# Patient Record
Sex: FEMALE | Race: WHITE | NOT HISPANIC OR LATINO | URBAN - METROPOLITAN AREA
[De-identification: names, ages, dates, MRNs, and addresses within clinical notes are randomized per-mention and may not be internally consistent; named-entity substitution may affect disease eponyms.]

---

## 2020-09-16 ENCOUNTER — OUTPATIENT (OUTPATIENT)
Dept: OUTPATIENT SERVICES | Facility: HOSPITAL | Age: 75
LOS: 1 days | End: 2020-09-16

## 2020-10-31 ENCOUNTER — OUTPATIENT (OUTPATIENT)
Dept: OUTPATIENT SERVICES | Facility: HOSPITAL | Age: 75
LOS: 1 days | End: 2020-10-31

## 2020-11-10 ENCOUNTER — OUTPATIENT (OUTPATIENT)
Dept: OUTPATIENT SERVICES | Facility: HOSPITAL | Age: 75
LOS: 1 days | End: 2020-11-10

## 2020-12-03 ENCOUNTER — OUTPATIENT (OUTPATIENT)
Dept: OUTPATIENT SERVICES | Facility: HOSPITAL | Age: 75
LOS: 1 days | End: 2020-12-03

## 2021-01-14 ENCOUNTER — OUTPATIENT (OUTPATIENT)
Dept: OUTPATIENT SERVICES | Facility: HOSPITAL | Age: 76
LOS: 1 days | End: 2021-01-14

## 2021-02-24 ENCOUNTER — OUTPATIENT (OUTPATIENT)
Dept: OUTPATIENT SERVICES | Facility: HOSPITAL | Age: 76
LOS: 1 days | End: 2021-02-24

## 2021-09-09 ENCOUNTER — OUTPATIENT (OUTPATIENT)
Dept: OUTPATIENT SERVICES | Facility: HOSPITAL | Age: 76
LOS: 1 days | End: 2021-09-09

## 2021-12-09 ENCOUNTER — OUTPATIENT (OUTPATIENT)
Dept: OUTPATIENT SERVICES | Facility: HOSPITAL | Age: 76
LOS: 1 days | End: 2021-12-09

## 2022-03-17 ENCOUNTER — OUTPATIENT (OUTPATIENT)
Dept: OUTPATIENT SERVICES | Facility: HOSPITAL | Age: 77
LOS: 1 days | End: 2022-03-17

## 2022-03-17 DIAGNOSIS — E78.00 PURE HYPERCHOLESTEROLEMIA, UNSPECIFIED: ICD-10-CM

## 2023-12-19 ENCOUNTER — NON-APPOINTMENT (OUTPATIENT)
Age: 78
End: 2023-12-19

## 2024-01-12 PROBLEM — Z00.00 ENCOUNTER FOR PREVENTIVE HEALTH EXAMINATION: Status: ACTIVE | Noted: 2024-01-12

## 2024-01-24 ENCOUNTER — APPOINTMENT (OUTPATIENT)
Dept: PLASTIC SURGERY | Facility: CLINIC | Age: 79
End: 2024-01-24
Payer: MEDICARE

## 2024-01-24 ENCOUNTER — NON-APPOINTMENT (OUTPATIENT)
Age: 79
End: 2024-01-24

## 2024-01-24 VITALS
OXYGEN SATURATION: 97 % | HEIGHT: 62 IN | BODY MASS INDEX: 29.08 KG/M2 | HEART RATE: 58 BPM | TEMPERATURE: 97 F | WEIGHT: 158 LBS | SYSTOLIC BLOOD PRESSURE: 140 MMHG | DIASTOLIC BLOOD PRESSURE: 60 MMHG

## 2024-01-24 DIAGNOSIS — Z63.4 DISAPPEARANCE AND DEATH OF FAMILY MEMBER: ICD-10-CM

## 2024-01-24 DIAGNOSIS — K85.30 DRUG INDUCED ACUTE PANCREATITIS WITHOUT NECROSIS OR INFECTION: ICD-10-CM

## 2024-01-24 DIAGNOSIS — E11.9 TYPE 2 DIABETES MELLITUS W/OUT COMPLICATIONS: ICD-10-CM

## 2024-01-24 DIAGNOSIS — Z42.8 ENCOUNTER FOR OTHER PLASTIC AND RECONSTRUCTIVE SURGERY FOLLOWING MEDICAL PROCEDURE OR HEALED INJURY: ICD-10-CM

## 2024-01-24 DIAGNOSIS — Z78.9 OTHER SPECIFIED HEALTH STATUS: ICD-10-CM

## 2024-01-24 DIAGNOSIS — I10 ESSENTIAL (PRIMARY) HYPERTENSION: ICD-10-CM

## 2024-01-24 DIAGNOSIS — F17.200 NICOTINE DEPENDENCE, UNSPECIFIED, UNCOMPLICATED: ICD-10-CM

## 2024-01-24 PROCEDURE — 99202 OFFICE O/P NEW SF 15 MIN: CPT

## 2024-01-24 RX ORDER — AMLODIPINE BESYLATE 5 MG/1
TABLET ORAL
Refills: 0 | Status: ACTIVE | COMMUNITY

## 2024-01-24 RX ORDER — LOSARTAN POTASSIUM 100 MG/1
TABLET, FILM COATED ORAL
Refills: 0 | Status: ACTIVE | COMMUNITY

## 2024-01-24 SDOH — SOCIAL STABILITY - SOCIAL INSECURITY: DISSAPEARANCE AND DEATH OF FAMILY MEMBER: Z63.4

## 2024-01-24 NOTE — REASON FOR VISIT
[Consultation] : a consultation visit [FreeTextEntry1] : regarding the biopsy she had done on her nose.

## 2024-03-07 ENCOUNTER — APPOINTMENT (OUTPATIENT)
Dept: PLASTIC SURGERY | Facility: CLINIC | Age: 79
End: 2024-03-07
Payer: MEDICARE

## 2024-03-07 VITALS
DIASTOLIC BLOOD PRESSURE: 66 MMHG | OXYGEN SATURATION: 98 % | HEIGHT: 62 IN | TEMPERATURE: 97.3 F | HEART RATE: 75 BPM | WEIGHT: 157 LBS | SYSTOLIC BLOOD PRESSURE: 134 MMHG | BODY MASS INDEX: 28.89 KG/M2

## 2024-03-07 DIAGNOSIS — C44.311 BASAL CELL CARCINOMA OF SKIN OF NOSE: ICD-10-CM

## 2024-03-07 PROCEDURE — 14060 TIS TRNFR E/N/E/L 10 SQ CM/<: CPT

## 2024-03-07 NOTE — REASON FOR VISIT
[Procedure: _________] : a [unfilled] procedure visit [FreeTextEntry1] : patient states she is here for a Mohs closure on her nose.

## 2024-03-07 NOTE — HISTORY OF PRESENT ILLNESS
[FreeTextEntry1] : "I feel ok" Patient presents for closure of Mohs defect on nose from  earlier today.  She said she was a little bit uncomfortable, but not so bad.  She admits to being a smoker, but states that she will try to keep from smoking during the healing process and is even considering calling the [employer] line for assistance. She has no other complaints.

## 2024-03-07 NOTE — PROCEDURE
[FreeTextEntry1] : Basal cell carcinoma of left nasal tip, Mohs defect of the nasal tip, 1.2 x 0.9 cm [FreeTextEntry3] : Local lidocaine with epinephrine, approximately 4 cc  [FreeTextEntry2] : Bi-lobed flap for closure of left nasal tip Mohs defect, 2 x 4 cm [FreeTextEntry5] : none [FreeTextEntry6] : Following informed consent and marking, the nasal tip and local surrounding tissue was infiltrated with 1% lidocaine with epi. Time was allowed to pass for vasoconstriction. Defect measured 1.2 x 0.9cm. The wound and sebastian wound area were prepped with povidone iodine. The cauterized edges of the Mohs defect were excised to freshen the edges.  The flap incisions were made with a 15 blade scalpel. This was carried through the dermis into the subcutaneous tissue. The wound was undermined under the planned bi-lobed flap in the subcutaneous plane.  The flap was elevated, then rotated into the defect. An extension of the inferior Burow's triangle was made to facilitate the rotation.  The wound was carefully examined for hemostasis. A 5-0 vicryl suture ligature was used to control a bleeding point at the base of the wound.  The closure was then begun. The donor defect was brought together at the critical corner to close the donor defect and advance the flap into the wound. This was found to fit nicely without tension. this was sutured with 5-0 vicryl buried sutures.  The subcutaneous tissue and dermis were brought together at the key point between the lobes of the flap and the wound using interrupted 5-0 Vicryl sutures. The tip of the superior flap was trimmed to match the defect.  The skin was then closed with interrupted simple 6-0 Nylon sutures.  The incision was dressed with vaseline..  Pt was given wound care and follow up instructions. Use Vaseline on the suture line TID. May cover with gauze or a mask if desired.  May shower tomorrow.  Follow up in one week.  Call for any questions or concerns. [FreeTextEntry4] : scant [FreeTextEntry7] : None

## 2024-03-13 ENCOUNTER — APPOINTMENT (OUTPATIENT)
Dept: PLASTIC SURGERY | Facility: CLINIC | Age: 79
End: 2024-03-13

## 2024-03-14 ENCOUNTER — APPOINTMENT (OUTPATIENT)
Dept: PLASTIC SURGERY | Facility: CLINIC | Age: 79
End: 2024-03-14
Payer: MEDICARE

## 2024-03-14 VITALS
SYSTOLIC BLOOD PRESSURE: 138 MMHG | TEMPERATURE: 97.3 F | HEART RATE: 76 BPM | HEIGHT: 62 IN | BODY MASS INDEX: 28.89 KG/M2 | DIASTOLIC BLOOD PRESSURE: 78 MMHG | WEIGHT: 157 LBS | OXYGEN SATURATION: 93 %

## 2024-03-14 PROCEDURE — 99024 POSTOP FOLLOW-UP VISIT: CPT

## 2024-03-14 NOTE — ADDENDUM
[FreeTextEntry1] : All medical record entries were at my direction and personally dictated by me (Dr. Destiny Gonzalez). I have reviewed the chart and agree that the record accurately reflects my personal performance of the history, physical exam, assessment and plan.

## 2024-03-14 NOTE — REASON FOR VISIT
[Follow-Up: _____] : a [unfilled] follow-up visit [FreeTextEntry1] : patient states the first couple of days after procedure she did have some bleeding. She states that around Monday or Tuesday it started to feel better.

## 2024-03-14 NOTE — ASSESSMENT
[FreeTextEntry1] : 79 y/o female for follow up of closure of Mohs defect of nose  Healing, with a small area of flap tip necrosis. Advised to continue to apply vaseline to the incisions. Follow up in 1 -2 weeks for wound care. All questions and concerns addressed. Plan and patient status as per Dr Gonzalez.

## 2024-03-14 NOTE — HISTORY OF PRESENT ILLNESS
[FreeTextEntry1] : "I had some bleeding"  Patient presents for follow up and removal of sutures.  She states that she had some bleeding when she left the office last week, but then it stopped.  She says she has been "better" with smoking but continues to smoke.  She has been applying vaseline as instructed to the incisions.  She had very minimal discomfort.  No other complaints.

## 2024-03-14 NOTE — PHYSICAL EXAM
[NI] : Normal [de-identified] : Incisions are C/D/I, sutures are in place.  There is a crescent area of the flap with necrosis of the edge Sutures are removed without complication.

## 2024-03-27 ENCOUNTER — APPOINTMENT (OUTPATIENT)
Dept: PLASTIC SURGERY | Facility: CLINIC | Age: 79
End: 2024-03-27
Payer: MEDICARE

## 2024-03-27 VITALS
SYSTOLIC BLOOD PRESSURE: 138 MMHG | HEART RATE: 71 BPM | OXYGEN SATURATION: 97 % | DIASTOLIC BLOOD PRESSURE: 76 MMHG | TEMPERATURE: 97.3 F

## 2024-03-27 PROCEDURE — 99024 POSTOP FOLLOW-UP VISIT: CPT

## 2024-03-30 NOTE — REASON FOR VISIT
[Follow-Up: _____] : a [unfilled] follow-up visit [FreeTextEntry1] : patient states she is doing well and has no complaints.

## 2024-03-30 NOTE — PHYSICAL EXAM
[NI] : Normal [de-identified] : There is a crescent area of the flap with necrosis tissue at the edge that is  from the healthy tissue. It is debrided sharply to reveal a healthy wound bed. Vaseline is applied to the area.

## 2024-03-30 NOTE — ASSESSMENT
[FreeTextEntry1] : 79 y/o female for follow up   Wound care for the nasal tip discussed with patient. Patient is advised to continue to keep the area moist with vaseline or cocoa butter and not allow a scab to form.  Follow up in 3 weeks  All questions and concerns addressed. Plan and patient status as per Dr Gonzalez.

## 2024-03-30 NOTE — HISTORY OF PRESENT ILLNESS
[FreeTextEntry1] : "I am still putting vaseline on my nose"  Patient presents for follow up and states that she has been applying vaseline to her nose, and that there is a large black scab on her nose.  She has no complaints at this time.

## 2024-04-11 ENCOUNTER — APPOINTMENT (OUTPATIENT)
Dept: PLASTIC SURGERY | Facility: CLINIC | Age: 79
End: 2024-04-11
Payer: MEDICARE

## 2024-04-11 VITALS
DIASTOLIC BLOOD PRESSURE: 62 MMHG | WEIGHT: 156 LBS | HEART RATE: 67 BPM | OXYGEN SATURATION: 98 % | SYSTOLIC BLOOD PRESSURE: 136 MMHG | BODY MASS INDEX: 28.71 KG/M2 | HEIGHT: 62 IN

## 2024-04-11 PROCEDURE — 99024 POSTOP FOLLOW-UP VISIT: CPT

## 2024-05-01 ENCOUNTER — APPOINTMENT (OUTPATIENT)
Dept: PLASTIC SURGERY | Facility: CLINIC | Age: 79
End: 2024-05-01
Payer: MEDICARE

## 2024-05-01 VITALS
SYSTOLIC BLOOD PRESSURE: 134 MMHG | HEART RATE: 65 BPM | OXYGEN SATURATION: 95 % | DIASTOLIC BLOOD PRESSURE: 64 MMHG | TEMPERATURE: 97 F | BODY MASS INDEX: 28.98 KG/M2 | WEIGHT: 157.5 LBS | HEIGHT: 62 IN

## 2024-05-01 DIAGNOSIS — Z09 ENCOUNTER FOR FOLLOW-UP EXAMINATION AFTER COMPLETED TREATMENT FOR CONDITIONS OTHER THAN MALIGNANT NEOPLASM: ICD-10-CM

## 2024-05-01 PROCEDURE — 99024 POSTOP FOLLOW-UP VISIT: CPT

## 2024-05-01 NOTE — ASSESSMENT
[FreeTextEntry1] : Nasal flap is well healed. May begin scar care. Pt was given written scar care instructions that were reviewed verbally, as follows:  Scars tend to heal fairly quickly (in 4-14 days depending on the site), then mature slowly over time. The vast majority of improvement occurs in the first 2-3 months, with the pink/red scar becoming softer, more pliable and lighter in color (depending upon skin tone). At 6 months, the scar appears lighter and at approximately one year, the scar reaches maximum improvement (with a range from 9-18 months). Scar care should include skin moisturizer, sunscreen, and if desired, silicone gel.  Silicone gel use should begin after all scabs and crust are gone and the scars are smooth and dry, usually about 3 weeks. The silicone gel should be used twice a day for a treatment course of three months to achieve benefit. Sunscreen and/or makeup can be applied after the silicone gel has been applied and allowed to dry.  Follow up in 3 months.

## 2024-05-01 NOTE — PHYSICAL EXAM
[NI] : Normal [de-identified] : The crescent area of the wound is completely epithelialized. The flap is still slightly thickened.

## 2024-08-07 ENCOUNTER — APPOINTMENT (OUTPATIENT)
Dept: PLASTIC SURGERY | Facility: CLINIC | Age: 79
End: 2024-08-07